# Patient Record
Sex: FEMALE | URBAN - METROPOLITAN AREA
[De-identification: names, ages, dates, MRNs, and addresses within clinical notes are randomized per-mention and may not be internally consistent; named-entity substitution may affect disease eponyms.]

---

## 2024-07-19 ENCOUNTER — APPOINTMENT (RX ONLY)
Dept: URBAN - METROPOLITAN AREA CLINIC 155 | Facility: CLINIC | Age: 19
Setting detail: DERMATOLOGY
End: 2024-07-19

## 2024-07-19 DIAGNOSIS — L23.9 ALLERGIC CONTACT DERMATITIS, UNSPECIFIED CAUSE: ICD-10-CM | Status: INADEQUATELY CONTROLLED

## 2024-07-19 PROBLEM — L30.9 DERMATITIS, UNSPECIFIED: Status: ACTIVE | Noted: 2024-07-19

## 2024-07-19 PROCEDURE — 99203 OFFICE O/P NEW LOW 30 MIN: CPT

## 2024-07-19 PROCEDURE — ? PRESCRIPTION MEDICATION MANAGEMENT

## 2024-07-19 PROCEDURE — ? COUNSELING

## 2024-07-19 PROCEDURE — ? PRESCRIPTION

## 2024-07-19 RX ORDER — CLOBETASOL PROPIONATE 0.5 MG/G
OINTMENT TOPICAL
Qty: 60 | Refills: 0 | Status: ERX | COMMUNITY
Start: 2024-07-19

## 2024-07-19 RX ADMIN — CLOBETASOL PROPIONATE: 0.5 OINTMENT TOPICAL at 00:00

## 2024-07-19 ASSESSMENT — LOCATION ZONE DERM: LOCATION ZONE: ARM

## 2024-07-19 ASSESSMENT — LOCATION SIMPLE DESCRIPTION DERM
LOCATION SIMPLE: RIGHT FOREARM
LOCATION SIMPLE: LEFT FOREARM

## 2024-07-19 ASSESSMENT — LOCATION DETAILED DESCRIPTION DERM
LOCATION DETAILED: RIGHT PROXIMAL DORSAL FOREARM
LOCATION DETAILED: LEFT PROXIMAL DORSAL FOREARM

## 2024-07-19 NOTE — PROCEDURE: PRESCRIPTION MEDICATION MANAGEMENT
Initiate Treatment: clobetasol 0.05 % topical ointment \\nQuantity: 60.0 g  Days Supply: 30\\nSig: Apply to hands & arms nightly x 2 weeks on, 2 weeks off. Repeat as needed
Detail Level: Zone
Render In Strict Bullet Format?: No
Plan: Pt reports benign awoke in the middle of the night to extreme pruritus. Pt reports that her arms were welded and she took a Benadryl. Pt reports that arms have improved since then. Will send in strong topical steroids. To help with inflammation. 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 Pt notified to watch what she comes in contact with at work due to it potentially being contact dermatitis.

## 2024-08-05 ENCOUNTER — APPOINTMENT (RX ONLY)
Dept: URBAN - METROPOLITAN AREA CLINIC 196 | Facility: CLINIC | Age: 19
Setting detail: DERMATOLOGY
End: 2024-08-05

## 2024-08-05 DIAGNOSIS — D485 NEOPLASM OF UNCERTAIN BEHAVIOR OF SKIN: ICD-10-CM

## 2024-08-05 PROBLEM — D48.5 NEOPLASM OF UNCERTAIN BEHAVIOR OF SKIN: Status: ACTIVE | Noted: 2024-08-05

## 2024-08-05 PROCEDURE — ? BIOPSY BY SHAVE METHOD

## 2024-08-05 PROCEDURE — 11102 TANGNTL BX SKIN SINGLE LES: CPT

## 2024-08-05 ASSESSMENT — LOCATION ZONE DERM: LOCATION ZONE: TRUNK

## 2024-08-05 ASSESSMENT — LOCATION DETAILED DESCRIPTION DERM: LOCATION DETAILED: RIGHT MEDIAL BREAST 4-5:00 REGION

## 2024-08-05 ASSESSMENT — LOCATION SIMPLE DESCRIPTION DERM: LOCATION SIMPLE: RIGHT BREAST

## 2024-08-05 NOTE — PROCEDURE: BIOPSY BY SHAVE METHOD

## 2024-10-14 ENCOUNTER — RX ONLY (OUTPATIENT)
Age: 19
Setting detail: RX ONLY
End: 2024-10-14

## 2024-10-14 RX ORDER — VALACYCLOVIR HYDROCHLORIDE 1 G/1
TABLET, FILM COATED ORAL
Qty: 14 | Refills: 0 | Status: ERX | COMMUNITY
Start: 2024-10-14

## 2024-10-21 ENCOUNTER — RX ONLY (OUTPATIENT)
Age: 19
Setting detail: RX ONLY
End: 2024-10-21

## 2024-10-21 RX ORDER — SULFAMETHOXAZOLE AND TRIMETHOPRIM 800; 160 MG/1; MG/1
TABLET ORAL
Qty: 14 | Refills: 0 | Status: ERX | COMMUNITY
Start: 2024-10-21

## 2025-01-27 ENCOUNTER — RX ONLY (RX ONLY)
Age: 20
End: 2025-01-27

## 2025-01-27 RX ORDER — KETOCONAZOLE 20 MG/ML
SHAMPOO, SUSPENSION TOPICAL
Qty: 120 | Refills: 10 | Status: ERX | COMMUNITY
Start: 2025-01-27

## 2025-04-10 ENCOUNTER — RX ONLY (RX ONLY)
Age: 20
End: 2025-04-10

## 2025-04-10 RX ORDER — ACYCLOVIR 50 MG/G
OINTMENT TOPICAL
Qty: 30 | Refills: 3 | Status: ERX | COMMUNITY
Start: 2025-04-10

## 2025-05-06 ENCOUNTER — APPOINTMENT (OUTPATIENT)
Dept: URBAN - METROPOLITAN AREA CLINIC 156 | Facility: CLINIC | Age: 20
Setting detail: DERMATOLOGY
End: 2025-05-06

## 2025-05-06 DIAGNOSIS — Z33.1 PREGNANT STATE, INCIDENTAL: ICD-10-CM

## 2025-05-06 PROCEDURE — ? ORDER TESTS

## 2025-05-06 NOTE — PROCEDURE: ORDER TESTS
Bill For Surgical Tray: no
Billing Type: Third-Party Bill
Expected Date Of Service: 05/06/2025
Performing Laboratory: 0

## 2025-05-29 ENCOUNTER — RX ONLY (RX ONLY)
Age: 20
End: 2025-05-29

## 2025-05-29 RX ORDER — CLINDAMYCIN PHOSPHATE 10 MG/G
GEL TOPICAL
Qty: 60 | Refills: 2 | Status: ERX | COMMUNITY
Start: 2025-05-29

## 2025-06-24 ENCOUNTER — APPOINTMENT (OUTPATIENT)
Age: 20
Setting detail: DERMATOLOGY
End: 2025-06-24

## 2025-06-24 DIAGNOSIS — D22 MELANOCYTIC NEVI: ICD-10-CM | Status: INADEQUATELY CONTROLLED

## 2025-06-24 PROBLEM — D48.5 NEOPLASM OF UNCERTAIN BEHAVIOR OF SKIN: Status: ACTIVE | Noted: 2025-06-24

## 2025-06-24 PROBLEM — D22.5 MELANOCYTIC NEVI OF TRUNK: Status: ACTIVE | Noted: 2025-06-24

## 2025-06-24 PROBLEM — D22.22 MELANOCYTIC NEVI OF LEFT EAR AND EXTERNAL AURICULAR CANAL: Status: ACTIVE | Noted: 2025-06-24

## 2025-06-24 PROBLEM — D22.21 MELANOCYTIC NEVI OF RIGHT EAR AND EXTERNAL AURICULAR CANAL: Status: ACTIVE | Noted: 2025-06-24

## 2025-06-24 PROCEDURE — ? BIOPSY BY PUNCH METHOD

## 2025-06-24 PROCEDURE — ? ADDITIONAL NOTES

## 2025-06-24 PROCEDURE — ? COUNSELING

## 2025-06-24 PROCEDURE — ? OBSERVATION

## 2025-06-24 ASSESSMENT — LOCATION ZONE DERM
LOCATION ZONE: TRUNK
LOCATION ZONE: FEET
LOCATION ZONE: EAR

## 2025-06-24 ASSESSMENT — LOCATION DETAILED DESCRIPTION DERM
LOCATION DETAILED: RIGHT MEDIAL UPPER BACK
LOCATION DETAILED: RIGHT INFERIOR POSTERIOR HELIX
LOCATION DETAILED: LEFT INFERIOR POSTERIOR HELIX
LOCATION DETAILED: RIGHT MEDIAL BREAST 5-6:00 REGION
LOCATION DETAILED: LEFT PLANTAR FOREFOOT OVERLYING 4TH METATARSAL

## 2025-06-24 ASSESSMENT — LOCATION SIMPLE DESCRIPTION DERM
LOCATION SIMPLE: RIGHT EAR
LOCATION SIMPLE: RIGHT UPPER BACK
LOCATION SIMPLE: LEFT EAR
LOCATION SIMPLE: RIGHT BREAST
LOCATION SIMPLE: LEFT PLANTAR SURFACE

## 2025-06-24 NOTE — PROCEDURE: ADDITIONAL NOTES
Render Risk Assessment In Note?: no
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
Detail Level: Simple

## 2025-06-24 NOTE — PROCEDURE: BIOPSY BY PUNCH METHOD
